# Patient Record
Sex: MALE | Employment: FULL TIME | URBAN - METROPOLITAN AREA
[De-identification: names, ages, dates, MRNs, and addresses within clinical notes are randomized per-mention and may not be internally consistent; named-entity substitution may affect disease eponyms.]

---

## 2023-02-09 ENCOUNTER — NURSE TRIAGE (OUTPATIENT)
Dept: NURSING | Facility: CLINIC | Age: 23
End: 2023-02-09

## 2023-02-09 NOTE — TELEPHONE ENCOUNTER
Nurse Triage SBAR    Is this a 2nd Level Triage? NO    Situation: Patient calling with sore throat.  Consent: not needed    Background: Pt was trying to schedule an appt today - but did the website health screening and because pt has sore throat sx this screening advised NOT to come to [facility].      Assessment:   * Sore throat   * Started 2 days ago.    * Pt states it is mild to moderate in rating.    Able to eat/drink.   * Denies any other cold sx.  Only sore throat is the sx.    Denies fever.    Denies Pus but notes redness.    Unsure if he was exposed to anyone with strep.     Pt's phone was cutting out and was disconnected from this call.     Writer called pt back at 9:11am and did no    Pt wants to make appt - scheduling states no same days anywhere in system.      Recommendation: Advised patient to call [facility] when they open or be seen in [facility] today to rule out strep. . Reviewed concerning symptoms and when to call back.       Iram Villaseñor RN [facility] 2/9/2023 8:54 AM      Reason for Disposition    Patient wants to be seen    Additional Information    Negative: SEVERE difficulty breathing (e.g., struggling for each breath, speaks in single words)    Negative: Sounds like a life-threatening emergency to the triager    Negative: Throat culture results, call about    Negative: Runny nose is main symptom    Negative: Productive cough is main symptom    Negative: Drooling or spitting out saliva (because can't swallow)    Negative: Unable to open mouth completely    Negative: Drinking very little and has signs of dehydration (e.g., no urine > 12 hours, very dry mouth, very lightheaded)    Negative: Patient sounds very sick or weak to the triager    Negative: Difficulty breathing (per caller) but not severe    Negative: Refuses to drink anything for > 12 hours    Negative: Fever > 103 F (39.4 C)    Negative: SEVERE sore throat pain    Negative: Pus on tonsils (back of throat) and swollen neck  lymph nodes ('glands')    Negative: Earache also present    Negative: Widespread rash (especially chest and abdomen)    Negative: Diabetes mellitus or weak immune system (e.g., HIV positive, cancer chemo, splenectomy, organ transplant, chronic steroids)    Negative: History of rheumatic fever    Protocols used: SORE THROAT-A-OH

## 2023-02-14 ENCOUNTER — NURSE TRIAGE (OUTPATIENT)
Dept: NURSING | Facility: CLINIC | Age: 23
End: 2023-02-14

## 2023-02-14 NOTE — TELEPHONE ENCOUNTER
Hurt foot recently, hit toe extremely hard, 2 days ago  -Left Great Toe  It had been ok, but started hurting more last night and then he just woke up and it is very swollen and painful     Thinks it may be an ingrown toenail     Red around the corner of toenail   This is the primary area of swelling as well   Rates pain 6/10  Looks like a sore there at the edge    Has not noticed any pus coming out, but he does see a little around the edge.   -pus is candace in color     No fever    Triaged to a disposition of see a provider within 24 hours. Patient is agreeable. Will call [facility] for appt or go to .     Reason for Disposition    Yellow pus seen in skin around toenail (cuticle area), or pus seen under toenail    Additional Information    Negative: Doesn't match the SYMPTOMS for ingrown toenail    Negative: Patient sounds very sick or weak to the triager    Negative: [1] Looks infected (spreading redness, red streak, pus) AND [2] fever    Negative: [1] Red streaking AND [2] longer than 1 inch (2.5 cm)    Negative: [1] Skin around the nail has become red AND [2] larger than 2 inches (5 cm)    Negative: Entire toe is red    Negative: Entire toe is swollen    Protocols used: TOENAIL - INGROWN-A-    Debby Alston RN on 2/14/2023 at 3:39 AM

## 2023-04-05 ENCOUNTER — TRANSFERRED RECORDS (OUTPATIENT)
Dept: HEALTH INFORMATION MANAGEMENT | Facility: CLINIC | Age: 23
End: 2023-04-05
Payer: COMMERCIAL

## 2023-04-14 ENCOUNTER — ANCILLARY PROCEDURE (OUTPATIENT)
Dept: ULTRASOUND IMAGING | Facility: CLINIC | Age: 23
End: 2023-04-14
Attending: FAMILY MEDICINE
Payer: COMMERCIAL

## 2023-04-14 ENCOUNTER — MEDICAL CORRESPONDENCE (OUTPATIENT)
Dept: HEALTH INFORMATION MANAGEMENT | Facility: CLINIC | Age: 23
End: 2023-04-14

## 2023-04-14 DIAGNOSIS — N50.811 RIGHT TESTICULAR PAIN: ICD-10-CM

## 2023-04-14 PROCEDURE — 76870 US EXAM SCROTUM: CPT | Mod: GC | Performed by: RADIOLOGY

## 2023-04-19 ENCOUNTER — TRANSCRIBE ORDERS (OUTPATIENT)
Dept: OTHER | Age: 23
End: 2023-04-19

## 2023-04-19 DIAGNOSIS — I86.1 BILATERAL VARICOCELES: Primary | ICD-10-CM

## 2023-04-27 NOTE — TELEPHONE ENCOUNTER
MEDICAL RECORDS REQUEST   [facility]  Urology Clinic  [address] [place] [address]  PHONE: [phone]  Fax: [phone]        FUTURE VISIT INFORMATION                                                   Ministerio Villalobos, : 2000 scheduled for future visit at [facility]    APPOINTMENT INFORMATION:    Date: 23    Provider:  Dang Ba    Reason for Visit/Diagnosis: Variocele    REFERRAL INFORMATION:    Referring provider:   Caitlin Combs     Referring providers clinic:   [facility] contact number:  [phone]    RECORDS REQUESTED FOR VISIT                                                     NOTES  STATUS/DETAILS   OFFICE NOTE from referring provider  yes- Scanned into chart under 23   IMAGING (IMAGES & REPORT)  yes US Testicular 23     PRE-VISIT CHECKLIST      Record collection complete Yes

## 2023-05-16 ENCOUNTER — PRE VISIT (OUTPATIENT)
Dept: UROLOGY | Facility: CLINIC | Age: 23
End: 2023-05-16
Payer: COMMERCIAL

## 2023-05-16 NOTE — TELEPHONE ENCOUNTER
Reason for visit: Consult Variocele      Relevant information: Hx of right testicular pain    Records/imaging/labs/orders: Epic    At Rooming: Jodi Zamora  5/16/2023  2:44 PM

## 2023-05-23 ENCOUNTER — PRE VISIT (OUTPATIENT)
Dept: UROLOGY | Facility: CLINIC | Age: 23
End: 2023-05-23

## 2023-06-04 ENCOUNTER — HEALTH MAINTENANCE LETTER (OUTPATIENT)
Age: 23
End: 2023-06-04

## 2023-07-14 NOTE — PROGRESS NOTES
Subjective   REQUESTING PROVIDER  Dr. Caitlin Combs MD     REASON FOR VISIT  Varicoceles     HISTORY OF PRESENT ILLNESS  Mr. Villalobos is a pleasant 22 year old male with no significant past medical history  who presents today for recent testicular pain and finding of bilateral varicoceles. I personally reviewed the primary care note from Dr. Combs in preparation for this visit.     Ministerio recently started to have right-sided testicular pain that seem to not be associated with activity, trauma, or abnormal findings.  Thought there was some subjective feeling of right testicular swelling, so a testicular ultrasound was obtained.  No evidence of orchitis or epididymitis were seen.  Evidence of a 3.5 mm right-sided and 3.2 mm left-sided varicocele, the right getting larger up to 3.9 mm with Valsalva.     Today:    Constant right sided testicular discomfort with intermittent actual pain    Discomfort is described as heavy, pain described as sharp    Pain and discomfort is annoying, but not super lmiting    Avoids fast movements     Taekwondo is slightly limited out of fear of making something worse    No urinary symptoms     No other pelvic pain     REVIEW OF SYSTEMS  Review of Systems   Constitutional: Negative for activity change and unexpected weight change.   HENT: Positive for tinnitus (Intermittent, rare). Negative for ear pain.    Eyes: Negative for visual disturbance.   Respiratory: Negative for shortness of breath.    Cardiovascular: Negative for chest pain.   Gastrointestinal: Negative for abdominal pain and constipation.   Genitourinary: Negative for hematuria.   Musculoskeletal: Negative for back pain.   Neurological: Negative for dizziness and light-headedness.   Hematological: Negative for adenopathy.   Psychiatric/Behavioral: Positive for sleep disturbance (dDiagnosed insomnia).      Social History:  Denies any history of or current smoking      Family History:  Denies any known family history of  urologic malignancy     Skin cancer in family     Objective     PHYSICAL EXAM  Physical Exam  Constitutional:       Appearance: Normal appearance.   HENT:      Head: Normocephalic and atraumatic.      Right Ear: External ear normal.      Left Ear: External ear normal.      Nose: Nose normal.   Eyes:      General:         Right eye: No discharge.         Left eye: No discharge.   Pulmonary:      Effort: Pulmonary effort is normal. No respiratory distress.   Abdominal:      General: There is no distension.   Genitourinary:     Comments: Uncircumcised phallus with no evidence of abnormal lesion or discomfort to palpation.  Foreskin easily able to be retracted and replaced over the head of the penis.    Bilateral testicles and spermatic cords palpated.  Left side without any discomfort or abnormality.  No varicocele noted at baseline or with Valsalva.  Right-sided testicular palpation prompted a slight increase in baseline discomfort.  Palpation of the spermatic cord did not increase any discomfort, and possible very minimal grade 1 varicocele with Valsalva on the right.  Musculoskeletal:         General: No deformity.   Neurological:      Mental Status: He is alert and oriented to person, place, and time.   Psychiatric:         Mood and Affect: Mood normal.       LABORATORY  No urine testing     IMAGING  I personally reviewed and interpreted the below testicular ultrasound from 4/14/2023     Findings:  The testes demonstrate normal and symmetric echotexture and  vascularity. No evidence of a focal lesion.  The right testicle  measures 5.4 x 2.8 x 2.7 cm and the left measures 5.4 x 2.9 x 2.8 cm.  There is no evidence of torsion.     There is no evidence of a significant hydrocele. Bilateral prominent  pampiniform veins largest measuring up to 3.5 mm on the right side (up  to 3.9 mm with valsalva) and 3.2 mm on the left side.     Both the right and left epididymis are within normal limits. Scrotal  kaela within right  hemiscrotum.     Impression: Bilateral varicoceles.     Assessment   1. Bilateral varicoceles  2. Right-sided testicular pain     It was my pleasure to meet with Mr. Villalobos in the office today in regards to his right-sided testicular pain as well as discovered varicoceles on ultrasound.  After reviewing his clinical history, we first discussed that I do not see anything dangerous going on.  With no evidence of torsion or infection or abnormal growths in or around the testicle, I see no reason that he needs to be restricting any of his activity or that he would hurt himself by continuing with his taekwondo.    With this in mind, I then unfortunately had to explain that I do not have any very clear etiology to explain his symptoms.  We discussed different possible etiologies including pelvic floor dysfunction, the very minimal grade 1 varicocele, nerve damage, or referred pain.  None of these etiologies seem to very clearly explain his symptoms, but we discussed different management options including conservative management with more supportive underwear, NSAIDs, and ice or heat, a referral to pelvic floor physical therapy for evaluation, or discussion of possible spermatic cord block or surgical interventions with Dr. Koroma.  Ministerio explained that he likes to avoid taking any medications if possible and would prefer to avoid surgery unless it is absolutely necessary.  With this in mind, he would prefer to have a referral to pelvic floor physical therapy, and as well as a dedicated follow-up with me in 4 months after he has had time to meet with the physical therapy team and put there recommendations in the practice.    Mr. Villalobos expressed understanding and agreement to the above discussion and plan and all of his questions were answered to his satisfaction.       PLAN    Referral to pelvic floor physical therapy     Follow-up in 4 months      Signed by:     Eliud Ba PA-C    I spent a total of 42  minutes spent on the date of the encounter doing chart review, history and exam, documentation, and further activities as noted above.

## 2023-07-18 ENCOUNTER — OFFICE VISIT (OUTPATIENT)
Dept: UROLOGY | Facility: CLINIC | Age: 23
End: 2023-07-18
Payer: COMMERCIAL

## 2023-07-18 VITALS — HEART RATE: 91 BPM | DIASTOLIC BLOOD PRESSURE: 70 MMHG | SYSTOLIC BLOOD PRESSURE: 113 MMHG

## 2023-07-18 DIAGNOSIS — N50.811 TESTICULAR PAIN, RIGHT: ICD-10-CM

## 2023-07-18 DIAGNOSIS — I86.1 BILATERAL VARICOCELES: Primary | ICD-10-CM

## 2023-07-18 PROCEDURE — 99243 OFF/OP CNSLTJ NEW/EST LOW 30: CPT | Performed by: STUDENT IN AN ORGANIZED HEALTH CARE EDUCATION/TRAINING PROGRAM

## 2023-07-18 ASSESSMENT — ENCOUNTER SYMPTOMS
BACK PAIN: 0
DIZZINESS: 0
ADENOPATHY: 0
SLEEP DISTURBANCE: 1
ACTIVITY CHANGE: 0
UNEXPECTED WEIGHT CHANGE: 0
LIGHT-HEADEDNESS: 0
ABDOMINAL PAIN: 0
SHORTNESS OF BREATH: 0
HEMATURIA: 0
CONSTIPATION: 0

## 2023-07-18 ASSESSMENT — PAIN SCALES - GENERAL: PAINLEVEL: NO PAIN (0)

## 2023-07-18 NOTE — PATIENT INSTRUCTIONS
Referral to pelvic floor physical therapy.     Conservative measures for testicular pain: ice/heat, more supportive underwear, and tylenol/ibuprofen as needed.     Follow-up visit with Eliud Ba PA-C in 4 months

## 2023-07-18 NOTE — LETTER
7/18/2023       RE: Ministerio Villalobos  [address]  [address]  [facility] [address]     Dear Colleague,    Thank you for referring your patient, Ministerio Villalobos, to the [facility] [place] [facility]. Please see a copy of my visit note below.    Subjective   REQUESTING PROVIDER  Dr. Caitlin Combs MD     REASON FOR VISIT  Varicoceles     HISTORY OF PRESENT ILLNESS  Mr. Villalobos is a pleasant 22 year old male with no significant past medical history  who presents today for recent testicular pain and finding of bilateral varicoceles. I personally reviewed the primary care note from Dr. Combs in preparation for this visit.     Ministerio recently started to have right-sided testicular pain that seem to not be associated with activity, trauma, or abnormal findings.  Thought there was some subjective feeling of right testicular swelling, so a testicular ultrasound was obtained.  No evidence of orchitis or epididymitis were seen.  Evidence of a 3.5 mm right-sided and 3.2 mm left-sided varicocele, the right getting larger up to 3.9 mm with Valsalva.     Today:  Constant right sided testicular discomfort with intermittent actual pain  Discomfort is described as heavy, pain described as sharp  Pain and discomfort is annoying, but not super lmiting  Avoids fast movements   Taekwondo is slightly limited out of fear of making something worse  No urinary symptoms   No other pelvic pain     REVIEW OF SYSTEMS  Review of Systems   Constitutional: Negative for activity change and unexpected weight change.   HENT: Positive for tinnitus (Intermittent, rare). Negative for ear pain.    Eyes: Negative for visual disturbance.   Respiratory: Negative for shortness of breath.    Cardiovascular: Negative for chest pain.   Gastrointestinal: Negative for abdominal pain and constipation.   Genitourinary: Negative for hematuria.   Musculoskeletal: Negative for  back pain.   Neurological: Negative for dizziness and light-headedness.   Hematological: Negative for adenopathy.   Psychiatric/Behavioral: Positive for sleep disturbance (dDiagnosed insomnia).      Social History:  Denies any history of or current smoking      Family History:  Denies any known family history of urologic malignancy     Skin cancer in family     Objective     PHYSICAL EXAM  Physical Exam  Constitutional:       Appearance: Normal appearance.   HENT:      Head: Normocephalic and atraumatic.      Right Ear: External ear normal.      Left Ear: External ear normal.      Nose: Nose normal.   Eyes:      General:         Right eye: No discharge.         Left eye: No discharge.   Pulmonary:      Effort: Pulmonary effort is normal. No respiratory distress.   Abdominal:      General: There is no distension.   Genitourinary:     Comments: Uncircumcised phallus with no evidence of abnormal lesion or discomfort to palpation.  Foreskin easily able to be retracted and replaced over the head of the penis.    Bilateral testicles and spermatic cords palpated.  Left side without any discomfort or abnormality.  No varicocele noted at baseline or with Valsalva.  Right-sided testicular palpation prompted a slight increase in baseline discomfort.  Palpation of the spermatic cord did not increase any discomfort, and possible very minimal grade 1 varicocele with Valsalva on the right.  Musculoskeletal:         General: No deformity.   Neurological:      Mental Status: He is alert and oriented to person, place, and time.   Psychiatric:         Mood and Affect: Mood normal.       LABORATORY  No urine testing     IMAGING  I personally reviewed and interpreted the below testicular ultrasound from 4/14/2023     Findings:  The testes demonstrate normal and symmetric echotexture and  vascularity. No evidence of a focal lesion.  The right testicle  measures 5.4 x 2.8 x 2.7 cm and the left measures 5.4 x 2.9 x 2.8 cm.  There is no  evidence of torsion.     There is no evidence of a significant hydrocele. Bilateral prominent  pampiniform veins largest measuring up to 3.5 mm on the right side (up  to 3.9 mm with valsalva) and 3.2 mm on the left side.     Both the right and left epididymis are within normal limits. Scrotal  kaela within right hemiscrotum.     Impression: Bilateral varicoceles.     Assessment   Bilateral varicoceles  Right-sided testicular pain     It was my pleasure to meet with Mr. Villalobos in the office today in regards to his right-sided testicular pain as well as discovered varicoceles on ultrasound.  After reviewing his clinical history, we first discussed that I do not see anything dangerous going on.  With no evidence of torsion or infection or abnormal growths in or around the testicle, I see no reason that he needs to be restricting any of his activity or that he would hurt himself by continuing with his taekwondo.    With this in mind, I then unfortunately had to explain that I do not have any very clear etiology to explain his symptoms.  We discussed different possible etiologies including pelvic floor dysfunction, the very minimal grade 1 varicocele, nerve damage, or referred pain.  None of these etiologies seem to very clearly explain his symptoms, but we discussed different management options including conservative management with more supportive underwear, NSAIDs, and ice or heat, a referral to pelvic floor physical therapy for evaluation, or discussion of possible spermatic cord block or surgical interventions with Dr. Koroma.  Ministerio explained that he likes to avoid taking any medications if possible and would prefer to avoid surgery unless it is absolutely necessary.  With this in mind, he would prefer to have a referral to pelvic floor physical therapy, and as well as a dedicated follow-up with me in 4 months after he has had time to meet with the physical therapy team and put there recommendations in the  practice.    Mr. Villalobos expressed understanding and agreement to the above discussion and plan and all of his questions were answered to his satisfaction.       PLAN  Referral to pelvic floor physical therapy   Follow-up in 4 months      Signed by:     Eliud Ba PA-C    I spent a total of 42 minutes spent on the date of the encounter doing chart review, history and exam, documentation, and further activities as noted above.

## 2023-07-18 NOTE — NURSING NOTE
Chief Complaint   Patient presents with     Consult     Varicocele consult       Blood pressure 113/70, pulse 91. There is no height or weight on file to calculate BMI.    There is no problem list on file for this patient.      No Known Allergies    No current outpatient medications on file.       Social History     Tobacco Use     Smoking status: Never     Smokeless tobacco: Never       Madai Clay CMA  7/18/2023  9:06 AM

## 2023-09-19 ENCOUNTER — NURSE TRIAGE (OUTPATIENT)
Dept: NURSING | Facility: CLINIC | Age: 23
End: 2023-09-19
Payer: COMMERCIAL

## 2023-09-19 NOTE — TELEPHONE ENCOUNTER
"Pt states that he just had a weird pain in his chest, it is no longer there. It lasted only a few mins but he states that he is having no chest pain issues. He is also having a neck pain that he has had for 15 mins. Per protocol it appears that his neck pain can be managed at home. Informed him to call back if he has any further issues.     Reason for Disposition   Neck pain or stiffness    Additional Information   Negative: Shock suspected (e.g., cold/pale/clammy skin, too weak to stand, low BP, rapid pulse)   Negative: Difficult to awaken or acting confused (e.g., disoriented, slurred speech)   Negative: [1] Similar pain previously AND [2] it was from \"heart attack\"   Negative: [1] Similar pain previously AND [2] it was from \"angina\" AND [3] not relieved by nitroglycerin   Negative: Sounds like a life-threatening emergency to the triager   Negative: Difficulty breathing or unusual sweating (e.g., sweating without exertion)   Negative: [1] Stiff neck (can't put chin to chest) AND [2] headache   Negative: [1] Stiff neck (can't put chin to chest) AND [2] fever   Negative: Weakness of an arm or hand   Negative: Problems with bowel or bladder control   Negative: Head is twisting to one side (or ask \"is it turning against your will?\")   Negative: Patient sounds very sick or weak to the triager   Negative: [1] SEVERE neck pain (e.g., excruciating, unable to do any normal activities) AND [2] not improved after 2 hours of pain medicine   Negative: [1] Fever > 100.0 F (37.8 C) AND [2] Intravenous Drug Use (IVDU)   Negative: [1] Fever > 100.0 F (37.8 C) AND [2] diabetes mellitus or weak immune system (e.g., HIV positive, cancer chemo, splenectomy, organ transplant, chronic steroids)   Negative: Numbness in an arm or hand (i.e., loss of sensation)   Negative: Tenderness or swelling of front of neck over windpipe   Negative: Rash in same area as pain (may be described as \"small blisters\")   Negative: High-risk adult (e.g., " history of cancer, HIV, or IV drug use)   Negative: [1] MODERATE neck pain (e.g., interferes with normal activities) AND [2] present > 3 days   Negative: Neck pain present > 2 weeks   Negative: Pain shoots (radiates) into arm or hand   Negative: Neck pain is a chronic symptom (recurrent or ongoing AND present > 4 weeks)   Negative: [1] Age > 50 AND [2] no history of prior similar neck pain   Negative: Caused by a twisting, bending, or lifting injury   Negative: Caused by overuse from recent vigorous activity (e.g., exercise, work, sports)    Protocols used: Neck Pain or [id number]-FA    Darlyn Mcnair RN on 9/19/2023 at 1:43 AM

## 2024-03-07 ENCOUNTER — ANCILLARY PROCEDURE (OUTPATIENT)
Dept: ULTRASOUND IMAGING | Facility: CLINIC | Age: 24
End: 2024-03-07
Attending: FAMILY MEDICINE
Payer: COMMERCIAL

## 2024-03-07 DIAGNOSIS — N50.89 TESTICULAR LUMP: ICD-10-CM

## 2024-03-07 PROCEDURE — 76870 US EXAM SCROTUM: CPT | Mod: GC | Performed by: STUDENT IN AN ORGANIZED HEALTH CARE EDUCATION/TRAINING PROGRAM

## 2024-03-07 PROCEDURE — 99207 US TESTICULAR AND SCROTUM WITH DOPPLER LIMITED: CPT | Mod: GC | Performed by: STUDENT IN AN ORGANIZED HEALTH CARE EDUCATION/TRAINING PROGRAM

## 2024-04-10 ENCOUNTER — ANCILLARY PROCEDURE (OUTPATIENT)
Dept: MRI IMAGING | Facility: CLINIC | Age: 24
End: 2024-04-10
Attending: FAMILY MEDICINE
Payer: COMMERCIAL

## 2024-04-10 DIAGNOSIS — S69.81XD INJURY OF TRIANGULAR FIBROCARTILAGE COMPLEX (TFCC) OF RIGHT WRIST, SUBSEQUENT ENCOUNTER: ICD-10-CM

## 2024-04-10 PROCEDURE — 73221 MRI JOINT UPR EXTREM W/O DYE: CPT | Mod: RT | Performed by: RADIOLOGY

## 2024-05-19 ENCOUNTER — NURSE TRIAGE (OUTPATIENT)
Dept: NURSING | Facility: CLINIC | Age: 24
End: 2024-05-19
Payer: COMMERCIAL

## 2024-05-19 NOTE — TELEPHONE ENCOUNTER
Nurse Triage SBAR    Consent: Not needed    Situation: Patient calling with back pain.    Background: Reports he has been traveling lately across the [place] by car and had slight pain in his back yesterday and today he woke up from the pain and it felt difficult to turn over and he noted the pain radiated into his chest. Pain is located in his upper back and radiates into chest on right side. Rates pain 6/10 and it is made worse with movement and breathing.     Assessment: chest/back pain 6/10    Protocol Recommended Disposition: Go to ED Now, See More Appropriate Guideline    Recommendation: Advised patient to go to ED now. Reviewed concerning symptoms and when to call back.     2LT or FYI: No    Follow-Up: None    ADS: No     Debby Malik RN [facility] 5/19/2024 6:02 AM    Reason for Disposition   [1] Pain in the upper back over the ribs (rib cage) AND [2] radiates (travels, goes) into chest   Long-distance travel in past month (e.g., car, bus, train, plane; with trip lasting 6 or more hours)    Additional Information   Negative: Passed out (i.e., lost consciousness, collapsed and was not responding)   Negative: Shock suspected (e.g., cold/pale/clammy skin, too weak to stand, low BP, rapid pulse)   Negative: SEVERE difficulty breathing (e.g., struggling for each breath, speaks in single words)   Negative: Difficult to awaken or acting confused (e.g., disoriented, slurred speech)   Negative: Shock suspected (e.g., cold/pale/clammy skin, too weak to stand, low BP, rapid pulse)   Negative: Passed out (i.e., lost consciousness, collapsed and was not responding)   Negative: Chest pain lasting longer than 5 minutes and ANY of the following:    history of heart disease  (i.e., heart attack, bypass surgery, angina, angioplasty, CHF; not just a heart murmur)    described as crushing, pressure-like, or heavy    age > 50    age > 30 AND at least one cardiac risk factor (i.e., hypertension, diabetes, obesity,  "smoker or strong family history of heart disease)    not relieved with nitroglycerin   Negative: Heart beating < 50 beats per minute OR > 140 beats per minute   Negative: Visible sweat on face or sweat dripping down face   Negative: SEVERE chest pain   Negative: [1] Chest pain (or \"angina\") comes and goes AND [2] is happening more often (increasing in frequency) or getting worse (increasing in severity)  (Exception: Chest pains that last only a few seconds.)   Negative: Pain also in shoulder(s) or arm(s) or jaw  (Exception: Pain is clearly made worse by movement.)   Negative: Difficulty breathing   Negative: Dizziness or lightheadedness   Negative: Coughing up blood   Negative: Cocaine use within last 3 days   Negative: Major surgery in past month   Negative: Hip or leg fracture (broken bone) in past month (or had cast on leg or ankle in past month)    Protocols used: Back Pain-A-AH, Chest Pain-A-AH    "

## 2024-07-24 ENCOUNTER — MEDICAL CORRESPONDENCE (OUTPATIENT)
Dept: HEALTH INFORMATION MANAGEMENT | Facility: CLINIC | Age: 24
End: 2024-07-24
Payer: COMMERCIAL

## 2024-07-25 ENCOUNTER — TRANSCRIBE ORDERS (OUTPATIENT)
Dept: OTHER | Age: 24
End: 2024-07-25

## 2024-07-25 DIAGNOSIS — M25.531 RIGHT WRIST PAIN: Primary | ICD-10-CM

## 2024-07-25 DIAGNOSIS — S63.591A TEAR OF TRIANGULAR FIBROCARTILAGE COMPLEX (TFCC) OF RIGHT WRIST: ICD-10-CM

## 2024-07-28 ENCOUNTER — HEALTH MAINTENANCE LETTER (OUTPATIENT)
Age: 24
End: 2024-07-28

## 2025-08-10 ENCOUNTER — HEALTH MAINTENANCE LETTER (OUTPATIENT)
Age: 25
End: 2025-08-10